# Patient Record
Sex: MALE | Race: WHITE | NOT HISPANIC OR LATINO | URBAN - METROPOLITAN AREA
[De-identification: names, ages, dates, MRNs, and addresses within clinical notes are randomized per-mention and may not be internally consistent; named-entity substitution may affect disease eponyms.]

---

## 2019-07-26 ENCOUNTER — EMERGENCY (EMERGENCY)
Facility: HOSPITAL | Age: 84
LOS: 0 days | Discharge: HOME | End: 2019-07-26
Attending: EMERGENCY MEDICINE | Admitting: EMERGENCY MEDICINE
Payer: MEDICARE

## 2019-07-26 VITALS
RESPIRATION RATE: 19 BRPM | HEART RATE: 81 BPM | SYSTOLIC BLOOD PRESSURE: 188 MMHG | TEMPERATURE: 97 F | OXYGEN SATURATION: 96 % | DIASTOLIC BLOOD PRESSURE: 79 MMHG

## 2019-07-26 VITALS
SYSTOLIC BLOOD PRESSURE: 158 MMHG | OXYGEN SATURATION: 95 % | TEMPERATURE: 96 F | HEART RATE: 63 BPM | DIASTOLIC BLOOD PRESSURE: 100 MMHG | RESPIRATION RATE: 18 BRPM

## 2019-07-26 DIAGNOSIS — Z79.01 LONG TERM (CURRENT) USE OF ANTICOAGULANTS: ICD-10-CM

## 2019-07-26 DIAGNOSIS — I48.91 UNSPECIFIED ATRIAL FIBRILLATION: ICD-10-CM

## 2019-07-26 DIAGNOSIS — Z87.891 PERSONAL HISTORY OF NICOTINE DEPENDENCE: ICD-10-CM

## 2019-07-26 DIAGNOSIS — Z79.899 OTHER LONG TERM (CURRENT) DRUG THERAPY: ICD-10-CM

## 2019-07-26 DIAGNOSIS — I71.4 ABDOMINAL AORTIC ANEURYSM, WITHOUT RUPTURE: ICD-10-CM

## 2019-07-26 DIAGNOSIS — E78.00 PURE HYPERCHOLESTEROLEMIA, UNSPECIFIED: ICD-10-CM

## 2019-07-26 DIAGNOSIS — I49.9 CARDIAC ARRHYTHMIA, UNSPECIFIED: ICD-10-CM

## 2019-07-26 DIAGNOSIS — M54.9 DORSALGIA, UNSPECIFIED: ICD-10-CM

## 2019-07-26 DIAGNOSIS — E78.5 HYPERLIPIDEMIA, UNSPECIFIED: ICD-10-CM

## 2019-07-26 LAB
ALBUMIN SERPL ELPH-MCNC: 4.3 G/DL — SIGNIFICANT CHANGE UP (ref 3.5–5.2)
ALP SERPL-CCNC: 105 U/L — SIGNIFICANT CHANGE UP (ref 30–115)
ALT FLD-CCNC: 15 U/L — SIGNIFICANT CHANGE UP (ref 0–41)
ANION GAP SERPL CALC-SCNC: 14 MMOL/L — SIGNIFICANT CHANGE UP (ref 7–14)
APTT BLD: 38.3 SEC — SIGNIFICANT CHANGE UP (ref 27–39.2)
AST SERPL-CCNC: 22 U/L — SIGNIFICANT CHANGE UP (ref 0–41)
BASOPHILS # BLD AUTO: 0.04 K/UL — SIGNIFICANT CHANGE UP (ref 0–0.2)
BASOPHILS NFR BLD AUTO: 0.7 % — SIGNIFICANT CHANGE UP (ref 0–1)
BILIRUB SERPL-MCNC: 1.1 MG/DL — SIGNIFICANT CHANGE UP (ref 0.2–1.2)
BUN SERPL-MCNC: 16 MG/DL — SIGNIFICANT CHANGE UP (ref 10–20)
CALCIUM SERPL-MCNC: 9.6 MG/DL — SIGNIFICANT CHANGE UP (ref 8.5–10.1)
CHLORIDE SERPL-SCNC: 100 MMOL/L — SIGNIFICANT CHANGE UP (ref 98–110)
CO2 SERPL-SCNC: 26 MMOL/L — SIGNIFICANT CHANGE UP (ref 17–32)
CREAT SERPL-MCNC: 0.8 MG/DL — SIGNIFICANT CHANGE UP (ref 0.7–1.5)
EOSINOPHIL # BLD AUTO: 0.08 K/UL — SIGNIFICANT CHANGE UP (ref 0–0.7)
EOSINOPHIL NFR BLD AUTO: 1.4 % — SIGNIFICANT CHANGE UP (ref 0–8)
GLUCOSE SERPL-MCNC: 112 MG/DL — HIGH (ref 70–99)
HCT VFR BLD CALC: 44.9 % — SIGNIFICANT CHANGE UP (ref 42–52)
HGB BLD-MCNC: 15.4 G/DL — SIGNIFICANT CHANGE UP (ref 14–18)
IMM GRANULOCYTES NFR BLD AUTO: 0.2 % — SIGNIFICANT CHANGE UP (ref 0.1–0.3)
INR BLD: 1.28 RATIO — SIGNIFICANT CHANGE UP (ref 0.65–1.3)
LACTATE SERPL-SCNC: 1.9 MMOL/L — SIGNIFICANT CHANGE UP (ref 0.5–2.2)
LIDOCAIN IGE QN: 38 U/L — SIGNIFICANT CHANGE UP (ref 7–60)
LYMPHOCYTES # BLD AUTO: 1.92 K/UL — SIGNIFICANT CHANGE UP (ref 1.2–3.4)
LYMPHOCYTES # BLD AUTO: 33.3 % — SIGNIFICANT CHANGE UP (ref 20.5–51.1)
MCHC RBC-ENTMCNC: 34.3 G/DL — SIGNIFICANT CHANGE UP (ref 32–37)
MCHC RBC-ENTMCNC: 35.8 PG — HIGH (ref 27–31)
MCV RBC AUTO: 104.4 FL — HIGH (ref 80–94)
MONOCYTES # BLD AUTO: 0.75 K/UL — HIGH (ref 0.1–0.6)
MONOCYTES NFR BLD AUTO: 13 % — HIGH (ref 1.7–9.3)
NEUTROPHILS # BLD AUTO: 2.96 K/UL — SIGNIFICANT CHANGE UP (ref 1.4–6.5)
NEUTROPHILS NFR BLD AUTO: 51.4 % — SIGNIFICANT CHANGE UP (ref 42.2–75.2)
NRBC # BLD: 0 /100 WBCS — SIGNIFICANT CHANGE UP (ref 0–0)
PLATELET # BLD AUTO: 189 K/UL — SIGNIFICANT CHANGE UP (ref 130–400)
POTASSIUM SERPL-MCNC: 4.4 MMOL/L — SIGNIFICANT CHANGE UP (ref 3.5–5)
POTASSIUM SERPL-SCNC: 4.4 MMOL/L — SIGNIFICANT CHANGE UP (ref 3.5–5)
PROT SERPL-MCNC: 7.4 G/DL — SIGNIFICANT CHANGE UP (ref 6–8)
PROTHROM AB SERPL-ACNC: 14.7 SEC — HIGH (ref 9.95–12.87)
RBC # BLD: 4.3 M/UL — LOW (ref 4.7–6.1)
RBC # FLD: 12 % — SIGNIFICANT CHANGE UP (ref 11.5–14.5)
SODIUM SERPL-SCNC: 140 MMOL/L — SIGNIFICANT CHANGE UP (ref 135–146)
TROPONIN T SERPL-MCNC: <0.01 NG/ML — SIGNIFICANT CHANGE UP
WBC # BLD: 5.76 K/UL — SIGNIFICANT CHANGE UP (ref 4.8–10.8)
WBC # FLD AUTO: 5.76 K/UL — SIGNIFICANT CHANGE UP (ref 4.8–10.8)

## 2019-07-26 PROCEDURE — 99284 EMERGENCY DEPT VISIT MOD MDM: CPT

## 2019-07-26 PROCEDURE — 74174 CTA ABD&PLVS W/CONTRAST: CPT | Mod: 26

## 2019-07-26 RX ORDER — WARFARIN SODIUM 2.5 MG/1
0 TABLET ORAL
Qty: 0 | Refills: 0 | DISCHARGE

## 2019-07-26 RX ORDER — ATORVASTATIN CALCIUM 80 MG/1
0 TABLET, FILM COATED ORAL
Qty: 0 | Refills: 0 | DISCHARGE

## 2019-07-26 RX ORDER — OXYCODONE AND ACETAMINOPHEN 5; 325 MG/1; MG/1
1 TABLET ORAL ONCE
Refills: 0 | Status: DISCONTINUED | OUTPATIENT
Start: 2019-07-26 | End: 2019-07-26

## 2019-07-26 RX ORDER — METHOCARBAMOL 500 MG/1
2 TABLET, FILM COATED ORAL
Qty: 18 | Refills: 0
Start: 2019-07-26 | End: 2019-07-28

## 2019-07-26 RX ORDER — VERAPAMIL HCL 240 MG
0 CAPSULE, EXTENDED RELEASE PELLETS 24 HR ORAL
Qty: 0 | Refills: 0 | DISCHARGE

## 2019-07-26 RX ORDER — METHOCARBAMOL 500 MG/1
1500 TABLET, FILM COATED ORAL ONCE
Refills: 0 | Status: COMPLETED | OUTPATIENT
Start: 2019-07-26 | End: 2019-07-26

## 2019-07-26 RX ADMIN — OXYCODONE AND ACETAMINOPHEN 1 TABLET(S): 5; 325 TABLET ORAL at 16:02

## 2019-07-26 RX ADMIN — OXYCODONE AND ACETAMINOPHEN 1 TABLET(S): 5; 325 TABLET ORAL at 14:20

## 2019-07-26 RX ADMIN — METHOCARBAMOL 1500 MILLIGRAM(S): 500 TABLET, FILM COATED ORAL at 14:20

## 2019-07-26 NOTE — ED PROVIDER NOTE - NSFOLLOWUPINSTRUCTIONS_ED_ALL_ED_FT
PLEASE FOLLOW UP WITH DR. MARLEY (VASCULAR SURGEON) IN 2 WEEKS IF STILL IN [place]. IF YOU RETURN TO [place], PLEASE FOLLOW UP WITH YOUR VASCULAR SURGEON AS SOON AS POSSIBLE.    Abdominal Aortic Aneurysm    An aneurysm is a weakened or damaged part of an artery wall that bulges from the normal force of blood pumping through the body. An abdominal aortic aneurysm is an aneurysm that occurs in the lower part of the aorta, the main artery of the body.     The major concern with an abdominal aortic aneurysm is that it can enlarge and burst (rupture) or blood can flow between the layers of the wall of the aorta through a tear (aortic dissection). Both of these conditions can cause bleeding inside the body and can be life threatening unless diagnosed and treated promptly.     CAUSES  The exact cause of an abdominal aortic aneurysm is unknown. Some contributing factors are:     A hardening of the arteries caused by the buildup of fat and other substances in the lining of a blood vessel (arteriosclerosis).   Inflammation of the walls of an artery (arteritis).    Connective tissue diseases, such as Marfan syndrome.    Abdominal trauma.    An infection, such as syphilis or staphylococcus, in the wall of the aorta (infectious aortitis) caused by bacteria.    RISK FACTORS  Risk factors that contribute to an abdominal aortic aneurysm may include:    Age older than 60 years.    High blood pressure (hypertension).  Male gender.  Ethnicity (white race).  Obesity.  Family history of aneurysm (first degree relatives only).  Tobacco use.     PREVENTION  The following healthy lifestyle habits may help decrease your risk of abdominal aortic aneurysm:    Quitting smoking. Smoking can raise your blood pressure and cause arteriosclerosis.  Limiting or avoiding alcohol.   Keeping your blood pressure, blood sugar level, and cholesterol levels within normal limits.  Decreasing your salt intake. In some people, too much salt can raise blood pressure and increase your risk of abdominal aortic aneurysm.  Eating a diet low in saturated fats and cholesterol.   Increasing your fiber intake by including whole grains, vegetables, and fruits in your diet. Eating these foods may help lower blood pressure.   Maintaining a healthy weight.   Staying physically active and exercising regularly.     SYMPTOMS  The symptoms of abdominal aortic aneurysm may vary depending on the size and rate of growth of the aneurysm. Most grow slowly and do not have any symptoms. When symptoms do occur, they may include:    Pain (abdomen, side, lower back, or groin). The pain may vary in intensity. A sudden onset of severe pain may indicate that the aneurysm has ruptured.  Feeling full after eating only small amounts of food.  Nausea or vomiting or both.  Feeling a pulsating lump in the abdomen.  Feeling faint or passing out.     DIAGNOSIS  Since most unruptured abdominal aortic aneurysms have no symptoms, they are often discovered during diagnostic exams for other conditions. An aneurysm may be found during the following procedures:    Ultrasonography (A one-time screening for abdominal aortic aneurysm by ultrasonography is also recommended for all men aged 65-75 years who have ever smoked).   X-ray exams.  A computed tomography (CT).  Magnetic resonance imaging (MRI).  Angiography or arteriography.     TREATMENT  Treatment of an abdominal aortic aneurysm depends on the size of your aneurysm, your age, and risk factors for rupture. Medication to control blood pressure and pain may be used to manage aneurysms smaller than 6 cm. Regular monitoring for enlargement may be recommended by your caregiver if:    The aneurysm is 3–4 cm in size (an annual ultrasonography may be recommended).  The aneurysm is 4–4.5 cm in size (an ultrasonography every 6 months may be recommended).  The aneurysm is larger than 4.5 cm in size (your caregiver may ask that you be examined by a vascular surgeon).    If your aneurysm is larger than 6 cm, surgical repair may be recommended. There are two main methods for repair of an aneurysm:     Endovascular repair (a minimally invasive surgery). This is done most often.  Open repair. This method is used if an endovascular repair is not possible.     ADDITIONAL NOTES AND INSTRUCTIONS    Please follow up with your Primary MD in 24-48 hr.  Seek immediate medical care for any new/worsening signs or symptoms.     Back Pain    Back pain is very common in adults. The cause of back pain is rarely dangerous and the pain often gets better over time. The cause of your back pain may not be known and may include strain of muscles or ligaments, degeneration of the spinal disks, or arthritis. Occasionally the pain may radiate down your leg(s). Over-the-counter medicines to reduce pain and inflammation are often the most helpful. Stretching and remaining active frequently helps the healing process.     SEEK IMMEDIATE MEDICAL CARE IF YOU HAVE ANY OF THE FOLLOWING SYMPTOMS: bowel or bladder control problems, unusual weakness or numbness in your arms or legs, nausea or vomiting, abdominal pain, fever, dizziness/lightheadedness.

## 2019-07-26 NOTE — ED PROVIDER NOTE - NS ED ROS FT
CONSTITUTIONAL: (-) fevers, (-) chills, (-) decreased appetite  CARDIO: (-) chest pain, (-) palpitations, (-) edema  PULM: (-) cough, (-) sputum, (-) shortness of breath  GI: (-) nausea, (-) vomiting, (-) diarrhea, (-) constipation, (-) abdominal pain, (-) melena, (-) hematochezia, (-) bowel incontinence/retention  : (-) dysuria, (-) hematuria, (-) frequency, (-) incontinence, (-) difficulty urinating, (-) urinary retention, (-) flank pain  MSK: see HPI  SKIN: (-) rashes, (-) wounds, (-) pallor, (-) ecchymosis, (-) jaundice  NEURO: (-) headache, (-) dizziness, (-) lightheadedness, (-) syncope, (-) numbness, (-) weakness, (-) paresthesias    *all other systems negative except as documented above and in the HPI*

## 2019-07-26 NOTE — ED PROVIDER NOTE - PROVIDER TOKENS
PROVIDER:[TOKEN:[[id number]],FOLLOWUP:[7-10 Days]],FREE:[LAST:[your primary medical doctor],PHONE:[(   )    -],FAX:[(   )    -],FOLLOWUP:[1-3 Days]]

## 2019-07-26 NOTE — ED PROVIDER NOTE - CLINICAL SUMMARY MEDICAL DECISION MAKING FREE TEXT BOX
I personally evaluated the patient. I reviewed the Resident’s or Physician Assistant’s note (as assigned above), and agree with the findings and plan except as documented in my note. imaging results discussed with dr. Haynes, patient can follow as an OP as per vasculaturesurgeoun. I have fully discussed the medical management and delivery of care with the patient. I have discussed any available labs, imaging and treatment options with the patient. Patient confirms understanding and has been given detailed return precautions. Patient instructed to return to the ED should symptoms persist or worsen. Patient has demonstrated capacity and has verbalized understanding. Patient is well appearing upon discharge. Patient and family comfortable with plan

## 2019-07-26 NOTE — ED PROVIDER NOTE - PHYSICAL EXAMINATION
VITALS:  I have reviewed the initial vital signs.  GENERAL: Well-developed, well-nourished, in no acute distress. Nontoxic.  HEENT: Sclera clear. No conjunctival injection. EOMI, PERRLA. Mucous membranes moist.  NECK: supple w FROM.  CARDIO: RRR, nl S1 and S2. No murmurs, rubs, or gallops. No peripheral edema. 2+ radial pulses bilaterally.  PULM: Normal effort. CTA b/l without wheezes, rales, or rhonchi.  MSK: +left lumbar paraspinal muscle ttp. +straight leg raise on the right, antalgic gait. No midline thoracic or lumbar spinous tenderness, step offs, or deformity.   GI: Normal bowel sounds. Abdomen soft and non-distended. Nontender in all four quadrants without rebound or guarding. No pulsatile masses.  : No CVA tenderness b/l.  SKIN: Warm, dry. No pallor or rashes. Capillary refill <2 seconds.  NEURO: A&Ox3. Speech clear. CN II-XII intact. 5/5 strength to upper and lower extremities b/l. Sensation intact and equal throughout. 2+ patellar reflexes b/l. VITALS:  I have reviewed the initial vital signs.  GENERAL: Well-developed, well-nourished, in no acute distress. Nontoxic.  HEENT: Sclera clear. No conjunctival injection. EOMI, PERRLA. Mucous membranes moist.  NECK: supple w FROM.  CARDIO: irregularly irregular, nl S1 and S2. No murmurs, rubs, or gallops. No peripheral edema. 2+ radial pulses bilaterally.  PULM: Normal effort. CTA b/l without wheezes, rales, or rhonchi.  MSK: +left lumbar paraspinal muscle ttp. +straight leg raise on the right, antalgic gait. No midline thoracic or lumbar spinous tenderness, step offs, or deformity.   GI: Normal bowel sounds. Abdomen soft and non-distended. Nontender in all four quadrants without rebound or guarding. No pulsatile masses.  : No CVA tenderness b/l.  SKIN: Warm, dry. No pallor or rashes. Capillary refill <2 seconds.  NEURO: A&Ox3. Speech clear. CN II-XII intact. 5/5 strength to upper and lower extremities b/l. Sensation intact and equal throughout. 2+ patellar reflexes b/l.

## 2019-07-26 NOTE — ED PROVIDER NOTE - CARE PROVIDER_API CALL
Corey Haynes)  Surgery; Vascular Surgery  [address] [place] [address], [address]  [place] [address]  Phone: [phone]  Fax: [phone]  Follow Up Time: 7-10 Days    your primary medical doctor,   Phone: (   )    -  Fax: (   )    -  Follow Up Time: 1-3 Days

## 2019-07-26 NOTE — ED PROVIDER NOTE - ATTENDING CONTRIBUTION TO CARE
84 year old male w hx of AAA s/p aorto graft 4 years ago, afib on coumadin, HLD, HTN, former smoker presents to the ED for evaluation of 3 weeks of constant, moderate, non-radiating b/l lower back pain. patient states pain is dull, located over bilateral lower lumbar region, patient pain is worsened with movements and started when he was lifting something.     CONSTITUTIONAL: Well-developed; well-nourished; in no acute distress. Sitting up and providing appropriate history and physical examination  SKIN: skin exam is warm and dry, no acute rash.  HEAD: Normocephalic; atraumatic.  EYES: PERRL, 3 mm bilateral, no nystagmus, EOM intact; conjunctiva and sclera clear.  ENT: No nasal discharge; airway clear.  NECK: Supple; non tender. + full passive ROM in all directions. No JVD  CARD: S1, S2 normal; no murmurs, gallops, or rubs. Regular rate and rhythm. + Symmetric Strong Pulses  RESP: No wheezes, rales or rhonchi. Good air movement bilaterally  ABD: soft; non-distended; non-tender. No Rebound, No Guarding, No signs of peritonitis, No CVA tenderness. No pulsatile abdominal mass. + Strong and Symmetric Pulses  BACK: + bilaterallower lumbar paraspinal tenderness, no midline ctls spinetenderness  EXT: Normal ROM. No clubbing, cyanosis or edema. Dp and Pt Pulses intact. Cap refill less than 3 seconds  NEURO: CN 2-12 intact, normal finger to nose, normal romberg, stable gait, no sensory or motor deficits, Alert, oriented, grossly unremarkable. No Focal deficits. GCS 15. NIH 0  PSYCH: Cooperative, appropriate.

## 2019-07-26 NOTE — ED PROVIDER NOTE - PROGRESS NOTE DETAILS
JOYCELYN: patient reports resolution in his symptoms after robaxin and percocet. appears more comfortable, back nontender throughout. labs, ct pending. JOYCELYN: CT shows no leak from graft but AAA size 7.4 (pt states it was 6.2 during repair 4 years ago). Consult placed to vascular fellow. JOYCELYN: spoke with vascular surgeon Dr. Haynes. States he looked at CT scan, does not see any leak, reports AAA appears stable though no imaging to compare to. Recommend d/c home, f/u in his office in 2 weeks if patient is still in [place], otherwise f/u with his personal vascular surgeon in [place].   Discussion relayed to patient, verbalizes understanding of return precautions and f/u.

## 2019-07-26 NOTE — ED ADULT TRIAGE NOTE - CHIEF COMPLAINT QUOTE
c/o low back pain which started 3 weeks ago after moving a rug and furniture. Yesterday pt went to see an orthopedist and was given tramadol and tizanedine and is pain is not better

## 2019-07-26 NOTE — ED PROVIDER NOTE - OBJECTIVE STATEMENT
84 year old male w hx of AAA s/p aorto graft 4 years ago, afib on coumadin, HLD, HTN, former smoker presents to the ED for evaluation of 3 weeks of constant, moderate, non-radiating b/l lower back pain x 3 weeks, worse with walking. Patient states his pain began as he was attempting to move a large area rug. Has seen a chiropractor twice and has been taking Tramadol and Tizanidine w/o relief of his symptoms. Denies fevers/chills, n/v, abd pain, melena, hematochezia, hematuria, bowel or bladder incontinence/retention, lower extremity weakness/numbness, or saddle paresthesias.

## 2019-07-29 ENCOUNTER — INBOUND DOCUMENT (OUTPATIENT)
Age: 84
End: 2019-07-29

## 2019-07-29 ENCOUNTER — APPOINTMENT (OUTPATIENT)
Dept: VASCULAR SURGERY | Facility: CLINIC | Age: 84
End: 2019-07-29
Payer: MEDICARE

## 2019-07-29 VITALS
HEIGHT: 75 IN | BODY MASS INDEX: 27.6 KG/M2 | WEIGHT: 222 LBS | SYSTOLIC BLOOD PRESSURE: 120 MMHG | DIASTOLIC BLOOD PRESSURE: 70 MMHG

## 2019-07-29 DIAGNOSIS — I71.4 ABDOMINAL AORTIC ANEURYSM, W/OUT RUPTURE: ICD-10-CM
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
## 2019-07-29 DIAGNOSIS — I10 ESSENTIAL (PRIMARY) HYPERTENSION: ICD-10-CM

## 2019-07-29 DIAGNOSIS — I87.2 VENOUS INSUFFICIENCY (CHRONIC) (PERIPHERAL): ICD-10-CM

## 2019-07-29 DIAGNOSIS — Z95.828 PRESENCE OF OTHER VASCULAR IMPLANTS AND GRAFTS: ICD-10-CM

## 2019-07-29 DIAGNOSIS — E78.00 PURE HYPERCHOLESTEROLEMIA, UNSPECIFIED: ICD-10-CM

## 2019-07-29 DIAGNOSIS — E13.01 OTHER SPECIFIED DIABETES MELLITUS WITH HYPEROSMOLARITY WITH COMA: ICD-10-CM

## 2019-07-29 DIAGNOSIS — Z87.891 PERSONAL HISTORY OF NICOTINE DEPENDENCE: ICD-10-CM

## 2019-07-29 PROBLEM — I48.91 UNSPECIFIED ATRIAL FIBRILLATION: Chronic | Status: ACTIVE | Noted: 2019-07-26

## 2019-07-29 PROBLEM — Z00.00 ENCOUNTER FOR PREVENTIVE HEALTH EXAMINATION: Status: ACTIVE | Noted: 2019-07-29

## 2019-07-29 PROCEDURE — 99203 OFFICE O/P NEW LOW 30 MIN: CPT

## 2019-07-29 PROCEDURE — 93978 VASCULAR STUDY: CPT

## 2019-07-29 RX ORDER — ATORVASTATIN CALCIUM 10 MG/1
10 TABLET, FILM COATED ORAL
Qty: 90 | Refills: 0 | Status: ACTIVE | COMMUNITY
Start: 2019-03-05

## 2019-07-29 RX ORDER — VERAPAMIL HYDROCHLORIDE 240 MG/1
240 TABLET ORAL
Qty: 90 | Refills: 0 | Status: ACTIVE | COMMUNITY
Start: 2018-08-09

## 2019-07-29 RX ORDER — WARFARIN 5 MG/1
5 TABLET ORAL
Qty: 180 | Refills: 0 | Status: ACTIVE | COMMUNITY
Start: 2019-04-23

## 2019-07-29 RX ORDER — FLUOROMETHOLONE 1 MG/ML
0.1 SOLUTION/ DROPS OPHTHALMIC
Qty: 15 | Refills: 0 | Status: ACTIVE | COMMUNITY
Start: 2019-03-26

## 2019-07-29 RX ORDER — TIZANIDINE 2 MG/1
2 TABLET ORAL
Qty: 42 | Refills: 0 | Status: ACTIVE | COMMUNITY
Start: 2019-07-25

## 2019-07-29 RX ORDER — METFORMIN HYDROCHLORIDE 500 MG/1
500 TABLET, COATED ORAL
Qty: 180 | Refills: 0 | Status: ACTIVE | COMMUNITY
Start: 2019-04-23

## 2019-07-29 RX ORDER — TRAMADOL HYDROCHLORIDE AND ACETAMINOPHEN 37.5; 325 MG/1; MG/1
37.5-325 TABLET, FILM COATED ORAL
Qty: 84 | Refills: 0 | Status: ACTIVE | COMMUNITY
Start: 2019-07-25

## 2019-07-29 RX ORDER — TIMOLOL MALEATE 5 MG/ML
0.5 SOLUTION OPHTHALMIC
Qty: 10 | Refills: 0 | Status: ACTIVE | COMMUNITY
Start: 2019-03-26

## 2019-07-29 RX ORDER — METHOCARBAMOL 500 MG/1
500 TABLET, FILM COATED ORAL
Qty: 18 | Refills: 0 | Status: ACTIVE | COMMUNITY
Start: 2019-07-26

## 2019-07-29 NOTE — HISTORY OF PRESENT ILLNESS
[FreeTextEntry1] : The patient is an 85 yo male with a history of a 6.4 cm s/p EVAR repair in 2013 using [employer] endograft in [place]., being f/u by cardiologist in [place] but unclear about the recent aneurysm sac size. The patient presented to the ED 4 days ago with lower back pain and had a CT scan that showed 7.4 cm AAA s/p EVAR with no endoleak. He is on coumadin for A fib. \par \par The patient has stated he has lower back pain that began after moving furniture while he was pulling heavy objects and bending over. The pain in lower back like a band and hurts with movement, especially getting out of bed. He has been prescribed muscle relaxants and pain medication. Very active physically, plays soft ball in [place].

## 2019-07-29 NOTE — DATA REVIEWED
[FreeTextEntry1] : Aortic duplex today in office 07/29/19: s/p EVAR, patent with AAA sac 7.8 cm. Limited study due to large abdomen, Possible endoleak from right iliac limb\par \par CT A/P 07/26/19 at [facility]: 7.4 cm AAA sac s/p EVAR with no endoleak

## 2019-07-29 NOTE — DATA REVIEWED
[FreeTextEntry1] : Aortic duplex today in office 07/29/19: s/p EVAR, patent with AAA sac 7.8 cm. Limited study due to large abdomen, Possible endoleak from right iliac limb\par \par CT A/P 07/26/19 at [facility]: 7.4 cm AAA sac s/p EVAR with no endoleak

## 2019-07-29 NOTE — ASSESSMENT
[FreeTextEntry1] : The patient is an 83 yo male with a history of 6.4 cm AAA sp EVAR 2013 in [place] who presented with lower back pain after moving furniture. The pain appears to be musculoskeletal related to heavy recent activity. I advised to continue pain medication, muscle relaxants. \par \par His AAA sac has grown from 6.4 cm origin size in 2013 to 7.4 cm current sac on recent CT scan which does not show any clear endoleak. The sac is intact. Considering the increase in the size, I am sending him for repeat CTA A/P in 1 month with delayed phase to identify any endoleak. He will f/u after CTA. He will require carotid duplex next visit. \par \par 
[FreeTextEntry1] : The patient is an 85 yo male with a history of 6.4 cm AAA sp EVAR 2013 in [place] who presented with lower back pain after moving furniture. The pain appears to be musculoskeletal related to heavy recent activity. I advised to continue pain medication, muscle relaxants. \par \par His AAA sac has grown from 6.4 cm origin size in 2013 to 7.4 cm current sac on recent CT scan which does not show any clear endoleak. The sac is intact. Considering the increase in the size, I am sending him for repeat CTA A/P in 1 month with delayed phase to identify any endoleak. He will f/u after CTA. He will require carotid duplex next visit. \par \par 
Yes

## 2019-07-29 NOTE — PHYSICAL EXAM
[0] : left 0 [2+] : right 2+ [Ankle Swelling (On Exam)] : present [Ankle Swelling On The Right] : mild [Varicose Veins Of Lower Extremities] : bilaterally [] : present [Ankle Swelling Bilaterally] : severe [Abdomen Masses] : No abdominal masses [Abdomen Tenderness] : ~T ~M No abdominal tenderness [Abdominal Bruit] : no abdominal bruit  [Alert] : alert [Oriented to Person] : oriented to person [FreeTextEntry1] : no evidence of tissue loss [Oriented to Place] : oriented to place [de-identified] : lower back tenderness.

## 2019-07-29 NOTE — PHYSICAL EXAM
[0] : left 0 [2+] : right 2+ [Ankle Swelling (On Exam)] : present [Varicose Veins Of Lower Extremities] : bilaterally [Ankle Swelling On The Right] : mild [] : bilaterally [Ankle Swelling Bilaterally] : severe [Abdomen Tenderness] : ~T ~M No abdominal tenderness [Abdomen Masses] : No abdominal masses [Alert] : alert [Abdominal Bruit] : no abdominal bruit  [Oriented to Person] : oriented to person [FreeTextEntry1] : no evidence of tissue loss [Oriented to Place] : oriented to place [de-identified] : lower back tenderness.

## 2019-07-29 NOTE — HISTORY OF PRESENT ILLNESS
[FreeTextEntry1] : The patient is an 83 yo male with a history of a 6.4 cm s/p EVAR repair in 2013 using [employer] endograft in [place]., being f/u by cardiologist in [place] but unclear about the recent aneurysm sac size. The patient presented to the ED 4 days ago with lower back pain and had a CT scan that showed 7.4 cm AAA s/p EVAR with no endoleak. He is on coumadin for A fib. \par \par The patient has stated he has lower back pain that began after moving furniture while he was pulling heavy objects and bending over. The pain in lower back like a band and hurts with movement, especially getting out of bed. He has been prescribed muscle relaxants and pain medication. Very active physically, plays soft ball in [place].

## 2019-07-30 ENCOUNTER — OUTPATIENT (OUTPATIENT)
Dept: OUTPATIENT SERVICES | Facility: HOSPITAL | Age: 84
LOS: 1 days | Discharge: HOME | End: 2019-07-30

## 2019-07-30 DIAGNOSIS — I71.4 ABDOMINAL AORTIC ANEURYSM, WITHOUT RUPTURE: ICD-10-CM

## 2019-08-04 ENCOUNTER — FORM ENCOUNTER (OUTPATIENT)
Age: 84
End: 2019-08-04

## 2019-08-05 ENCOUNTER — OUTPATIENT (OUTPATIENT)
Dept: OUTPATIENT SERVICES | Facility: HOSPITAL | Age: 84
LOS: 1 days | Discharge: HOME | End: 2019-08-05
Payer: MEDICARE

## 2019-08-05 DIAGNOSIS — I71.4 ABDOMINAL AORTIC ANEURYSM, WITHOUT RUPTURE: ICD-10-CM

## 2019-08-05 PROCEDURE — 74174 CTA ABD&PLVS W/CONTRAST: CPT | Mod: 26

## 2019-08-07 LAB
BUN SERPL-MCNC: 13 MG/DL
CREAT SERPL-MCNC: 0.9 MG/DL

## 2019-09-03 NOTE — ED ADULT TRIAGE NOTE - PRO INTERPRETER NEED 2
"Pt is a 34 year old male referred by Dr Sawyer here today for:     L Rib pain   Initially presented 5/21/19 w/ rib pain [facility]  Neg rib fx but subsequently noted to have small R pneumothorax -> resolved but rib pain has persisted  Overall pain is significantly better  Heavy lifting and core activities still very sore  Restarted [employer] classes again in mid July but between injury and then, no real gym workouts or impact   No pain w/ regular activities     See below for additional details    [facility] [employer] message 8/19/19:   \"Otoniel Solorio,     Thanks for the response. I am still on the fence with the option I want to choose, but I am leaning towards the ortho option to look deeper into my muscles/tendons. My breathing isn't an issue for me. I can run across the street without getting phased (out of breath). It's just when I lay flat on a hard surface or lift something really heavy on my left side that I can feel the deep pain in my ribcage on that side. I went kayaking over the weekend, but the actual paddling wasn't hard to do, it was lifting the kayak off the beach with my left arm (where the pain is under) versus my right that was much harder to do\"    Past Medical History:   Diagnosis Date     NO ACTIVE PROBLEMS       No past surgical history on file.   Current Outpatient Medications   Medication Sig Dispense Refill     ibuprofen (ADVIL/MOTRIN) 200 MG tablet Take 200 mg by mouth every 4 hours as needed for mild pain        No Known Allergies   Social History     Tobacco Use     Smoking status: Never Smoker     Smokeless tobacco: Never Used   Substance Use Topics     Alcohol use: None     Drug use: None      No family history on file.     ROS:   Gen- no fevers/chills   Rheum - no morning stiffness   Derm - no rash/ redness   Neuro - no numbness, no tingling   Remainder of ROS negative.     Exam:   Ht 1.854 m (6' 1\")   Wt 81.6 kg (180 lb)   BMI 23.75 kg/m       +TTP along axillar " ribcage on L   No pain w/ resisted shoulder/ arm motions      (S20.212D) Rib contusion, left, subsequent encounter  (primary encounter diagnosis)  Comment:   Plan: reviewed likely dx of rib contusion vs occult fx vs intercostal muscle injury; regardless, tx would be 4-6 wks of rest and subsequent rehab and reintroduction of activities; I discussed checking a bone scan or MRI though I am unsure how much this would change mgmt.  I also discussed option of PT w/ focus on obliques/ core; he can pad the area when sparring or try a lidoderm patch prn; he declined all imaging and interventions and was pleased w/ reassurance; f/u prn    (S29.011D) Intercostal muscle strain, subsequent encounter  Comment:   Plan: see above      Andriy Sam MD  September 3, 2019  4:34 PM     English
